# Patient Record
Sex: FEMALE | Race: WHITE | NOT HISPANIC OR LATINO | Employment: FULL TIME | URBAN - METROPOLITAN AREA
[De-identification: names, ages, dates, MRNs, and addresses within clinical notes are randomized per-mention and may not be internally consistent; named-entity substitution may affect disease eponyms.]

---

## 2018-09-05 ENCOUNTER — TELEPHONE (OUTPATIENT)
Dept: ADMINISTRATIVE | Facility: HOSPITAL | Age: 63
End: 2018-09-05

## 2019-02-18 ENCOUNTER — OFFICE VISIT (OUTPATIENT)
Dept: URGENT CARE | Facility: CLINIC | Age: 64
End: 2019-02-18
Payer: MEDICAID

## 2019-02-18 VITALS
SYSTOLIC BLOOD PRESSURE: 124 MMHG | DIASTOLIC BLOOD PRESSURE: 102 MMHG | BODY MASS INDEX: 37.45 KG/M2 | WEIGHT: 233 LBS | TEMPERATURE: 98 F | HEART RATE: 69 BPM | OXYGEN SATURATION: 97 % | HEIGHT: 66 IN

## 2019-02-18 DIAGNOSIS — S99.912A INJURY OF LEFT ANKLE, INITIAL ENCOUNTER: Primary | ICD-10-CM

## 2019-02-18 PROCEDURE — 73610 XR ANKLE COMPLETE 3 VIEW LEFT: ICD-10-PCS | Mod: LT,S$GLB,, | Performed by: RADIOLOGY

## 2019-02-18 PROCEDURE — 73630 XR FOOT COMPLETE 3 VIEW LEFT: ICD-10-PCS | Mod: LT,S$GLB,, | Performed by: RADIOLOGY

## 2019-02-18 PROCEDURE — 99214 OFFICE O/P EST MOD 30 MIN: CPT | Mod: S$GLB,,, | Performed by: PHYSICIAN ASSISTANT

## 2019-02-18 PROCEDURE — 73610 X-RAY EXAM OF ANKLE: CPT | Mod: LT,S$GLB,, | Performed by: RADIOLOGY

## 2019-02-18 PROCEDURE — 73630 X-RAY EXAM OF FOOT: CPT | Mod: LT,S$GLB,, | Performed by: RADIOLOGY

## 2019-02-18 PROCEDURE — 99214 PR OFFICE/OUTPT VISIT, EST, LEVL IV, 30-39 MIN: ICD-10-PCS | Mod: S$GLB,,, | Performed by: PHYSICIAN ASSISTANT

## 2019-02-18 RX ORDER — ETODOLAC 400 MG/1
400 TABLET, FILM COATED ORAL 2 TIMES DAILY
Qty: 30 TABLET | Refills: 0 | Status: SHIPPED | OUTPATIENT
Start: 2019-02-18 | End: 2019-03-05

## 2019-02-18 NOTE — PROGRESS NOTES
"Subjective:       Patient ID: Conchita Iyer is a 63 y.o. female.    Vitals:  height is 5' 6" (1.676 m) and weight is 105.7 kg (233 lb). Her oral temperature is 97.5 °F (36.4 °C). Her blood pressure is 124/102 (abnormal) and her pulse is 69. Her oxygen saturation is 97%.     Chief Complaint: Ankle Injury    Ankle Injury    The incident occurred 12 to 24 hours ago. The incident occurred at home. The injury mechanism was an inversion injury. The pain is present in the left ankle. The quality of the pain is described as aching. The pain is at a severity of 8/10. The pain is moderate. The pain has been constant since onset. Pertinent negatives include no numbness or tingling. She reports no foreign bodies present. The symptoms are aggravated by weight bearing. She has tried ice for the symptoms. The treatment provided mild relief.       Constitution: Negative for fatigue.   HENT: Negative for facial swelling and facial trauma.    Neck: Negative for neck stiffness.   Cardiovascular: Negative for chest trauma.   Eyes: Negative for eye trauma, double vision and blurred vision.   Gastrointestinal: Positive for nausea. Negative for abdominal trauma, abdominal pain and rectal bleeding.   Genitourinary: Negative for hematuria, missed menses, genital trauma and pelvic pain.   Musculoskeletal: Positive for pain, trauma and joint pain. Negative for joint swelling, abnormal ROM of joint and back pain.   Skin: Negative for color change, wound, abrasion, laceration and bruising.   Neurological: Negative for dizziness, history of vertigo, light-headedness, coordination disturbances, altered mental status, loss of consciousness and numbness.   Hematologic/Lymphatic: Negative for history of bleeding disorder.   Psychiatric/Behavioral: Negative for altered mental status.       Objective:      Physical Exam   Constitutional: She is oriented to person, place, and time. She appears well-developed and well-nourished. She is cooperative.  " Non-toxic appearance. She does not appear ill. No distress.   HENT:   Head: Normocephalic and atraumatic.   Right Ear: Hearing, tympanic membrane, external ear and ear canal normal.   Left Ear: Hearing, tympanic membrane, external ear and ear canal normal.   Nose: Nose normal. No mucosal edema, rhinorrhea or nasal deformity. No epistaxis. Right sinus exhibits no maxillary sinus tenderness and no frontal sinus tenderness. Left sinus exhibits no maxillary sinus tenderness and no frontal sinus tenderness.   Mouth/Throat: Uvula is midline, oropharynx is clear and moist and mucous membranes are normal. No trismus in the jaw. Normal dentition. No uvula swelling. No posterior oropharyngeal erythema.   Eyes: Conjunctivae and lids are normal. Right eye exhibits no discharge. Left eye exhibits no discharge. No scleral icterus.   Sclera clear bilat   Neck: Trachea normal, normal range of motion, full passive range of motion without pain and phonation normal. Neck supple.   Cardiovascular: Normal rate, regular rhythm, normal heart sounds, intact distal pulses and normal pulses.   Pulmonary/Chest: Effort normal and breath sounds normal. No respiratory distress.   Abdominal: Soft. Normal appearance and bowel sounds are normal. She exhibits no distension, no pulsatile midline mass and no mass. There is no tenderness.   Musculoskeletal: Normal range of motion. She exhibits no edema or deformity.        Left ankle: She exhibits swelling. She exhibits no deformity, no laceration and normal pulse. Tenderness. AITFL tenderness found. Achilles tendon normal. Achilles tendon exhibits normal Jimenez's test results.        Feet:    Neurological: She is alert and oriented to person, place, and time. She exhibits normal muscle tone. Coordination normal.   Skin: Skin is warm, dry and intact. She is not diaphoretic. No pallor.   Psychiatric: She has a normal mood and affect. Her speech is normal and behavior is normal. Judgment and thought  content normal. Cognition and memory are normal.   Nursing note and vitals reviewed.      Xr Ankle Complete 3 View Left    Result Date: 2/18/2019  EXAMINATION: XR ANKLE COMPLETE 3 VIEW LEFT CLINICAL HISTORY: Unspecified injury of left ankle, initial encounter TECHNIQUE: AP, lateral and oblique views of the left ankle were performed. 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  Mild heel spur, DJD changes dorsal tarsal joints.     No fracture dislocation.  Soft tissue injury particularly lateral ankle. Electronically signed by: Beny Alonso MD Date:    02/18/2019 Time:    12:09    Xr Foot Complete 3 View Left    Result Date: 2/18/2019  EXAMINATION: XR FOOT COMPLETE 3 VIEW LEFT CLINICAL HISTORY: .  Unspecified injury of left ankle, initial encounter TECHNIQUE: AP, lateral and oblique views of the left foot were performed. COMPARISON: None FINDINGS: DJD 1st MTP, included ankle joint and dorsal aspect tarsal joints.  Heel spur.     No fracture dislocation. Electronically signed by: Beny Alonso MD Date:    02/18/2019 Time:    12:11      Assessment:       1. Injury of left ankle, initial encounter        Plan:         Injury of left ankle, initial encounter  -     XR FOOT COMPLETE 3 VIEW LEFT; Future; Expected date: 02/18/2019  -     XR ANKLE COMPLETE 3 VIEW LEFT; Future; Expected date: 02/18/2019  -     ORTHOPEDIC BRACING FOR HOME USE - LOWER EXTREMITY  -     etodolac (LODINE) 400 MG tablet; Take 1 tablet (400 mg total) by mouth 2 (two) times daily. for 15 days  Dispense: 30 tablet; Refill: 0  -     CRUTCHES FOR HOME USE  -     Ambulatory referral to Orthopedics      Patient Instructions   · Follow up with your primary care in 2-5 days if symptoms have not improved, or you may return here.  · If you were referred to a specialist, please  follow up with that specialty.  · If you were prescribed antibiotics, please take them to completion.  · If you were prescribed a narcotic or any medication with sedative effects, do not drive or operate heavy equipment or machinery while taking these medications.  · You must understand that you have received treatment at an Urgent Care facility only, and that you may be released before all of your medical problems are known or treated. Urgent Care facilities are not equipped to handle life threatening emergencies. It is recommended that you go to an Emergency Department for further evaluation of worsening or concerning symptoms, or possibly life threatening conditions as discussed.                                        If you  smoke, please stop smoking            Ankle Sprain (Adult)  An ankle sprain is a stretching or tearing of the ligaments that hold the ankle joint together. There are no broken bones.  An ankle sprain is a common injury for both children and adults. It happens when the ankle turns, twists, or rolls in an awkward way. This can be caused by a sports injury. Or it can happen from doing something as simple as stepping on an uneven surface.  Ligaments are made of tough connective tissue. Normally, ligaments stretch a certain amount and then go back to their normal place. A sprain happens when a ligament is forced to stretch more than the normal amount. A severe sprain can actually tear the ligaments. If you have a severe sprain, you may have felt or heard something like a pop when you were injured.  Ankle sprains are given a grade depending on whether they are mild, moderate, or severe:  · Grade 1 sprain. A mild sprain with minor stretching and damage to the ligament.  · Grade 2 sprain. A moderate sprain where the ligament is partly torn.  · Grade 3 sprain. The most severe kind of sprain. The ligament is completely torn.  Most sprains take about 4 to 6 weeks to heal. A severe sprain can take  several months to recover.  Your healthcare provider may order X-rays to be sure you dont have a fracture, or broken bone.  The injured area will feel sore.  Swelling and pain may make it hard to walk. You may need crutches if walking is painful. Or your provider may have you use a cast boot or air splint. This will depend on the grade of ankle sprain that you have.    Home care  · For a Grade 1 sprain, use RICE (rest, ice, compression, and elevation):  · Rest your ankle. Dont walk on it.  · Ice should be used right away to help control swelling. Place an ice pack over the injured area for 20 minutes. Do this every 3 to 6 hours for the first 24 to 48 hours. Keep using ice packs to ease pain and swelling as needed. To make an ice pack, put ice cubes in a plastic bag that seals at the top. Wrap the bag in a clean, thin towel or cloth. Never put ice or an ice pack directly on the skin. The ice pack can be put right on the cast, bandage, or splint. As the ice melts, be careful that the cast, bandage, or splint doesnt get wet. If you have a boot, open it to apply an ice pack, unless told otherwise by your provider.  · Compression devices help to control swelling. They also keep the ankle from moving and support your injured ankle. These devices include dressings, bandages, and wraps.  · Elevate or raise your ankle above the level of your heart when sitting or lying down. This is very important for the first 48 hours.  · Follow the RICE guidelines for a Grade 2 sprain. This type of sprain will take longer to heal. Your provider may have you wear a splint, cast, or brace to keep your ankle from moving.  ·  If you have a Grade 3 sprain, you are at risk for long-term ankle instability. In rare cases, surgery may be needed. Your provider may have you wear a short leg cast or a walking boot for 2 to 3 weeks.  · After 48 hours, it may be helpful to apply heat for 20 minutes several times a day. You can do this with a heating  pad or warm compress. Or you may want to go back and forth between using ice and heat. Never apply heat directly to the skin. Always wrap the heating pad or warm compress in a clean, thin towel or cloth.  · You may use over-the-counter pain medicine (NSAIDS or nonsteroidal anti-inflammatory drugs) to control pain, unless another pain medicine was prescribed. Talk with your provider before using these medicines if you have chronic liver or kidney disease, or have ever had a stomach ulcer or GI (gastrointestinal) bleeding.  · Follow any rehabilitation exercises your provider gives you. These can help you be more flexible and improve your balance and coordination. This is helpful in preventing long-term ankle problems.  Prevention  To help prevent ankle sprains, its important to have good strength, balance, and flexibility. Be sure to:  · Always warm up before you exercise or do something very active  · Be careful when walking or running on uneven or cracked surfaces  · Wear shoes that are in good condition and fit well  · Listen to your bodys signals to slow down when you are in pain or tired  Follow-up care  Any X-rays you had today dont show any broken bones, breaks, or fractures. Sometimes fractures dont show up on the first X-ray. Bruises and sprains can sometimes hurt as much as a fracture. These injuries can take time to heal completely. If your symptoms dont get better or they get worse, talk with your healthcare provider. You may need a repeat X-ray.  Follow up with your healthcare provider, or as advised. Check for any warning signs listed below.  When to seek medical advice  Call your healthcare provider right away if any of these occur:  · Fever of 100.4 F (38 C) or higher, or as directed by your healthcare provider  · The injury doesnt seem to be healing  · The swelling comes back  · The cast has a bad smell  · The plaster cast or splint gets wet or soft  · The fiberglass cast or splint gets wet and  does not dry for 24 hours  · The pain or swelling increases, or redness appears  · Your toes become cold, blue, numb, or tingly  · The skin is discolored (looks blue, purple, or gray), has blisters, or is irritated  · You re-injure your ankle  Date Last Reviewed: 11/20/2015  © 7134-1897 [employer]. [address] [place] [address]. All rights reserved. This information is not intended as a substitute for professional medical care. Always follow your healthcare professional's instructions.

## 2019-02-18 NOTE — PATIENT INSTRUCTIONS
· Follow up with your primary care in 2-5 days if symptoms have not improved, or you may return here.  · If you were referred to a specialist, please follow up with that specialty.  · If you were prescribed antibiotics, please take them to completion.  · If you were prescribed a narcotic or any medication with sedative effects, do not drive or operate heavy equipment or machinery while taking these medications.  · You must understand that you have received treatment at an Urgent Care facility only, and that you may be released before all of your medical problems are known or treated. Urgent Care facilities are not equipped to handle life threatening emergencies. It is recommended that you go to an Emergency Department for further evaluation of worsening or concerning symptoms, or possibly life threatening conditions as discussed.                                        If you  smoke, please stop smoking            Ankle Sprain (Adult)  An ankle sprain is a stretching or tearing of the ligaments that hold the ankle joint together. There are no broken bones.  An ankle sprain is a common injury for both children and adults. It happens when the ankle turns, twists, or rolls in an awkward way. This can be caused by a sports injury. Or it can happen from doing something as simple as stepping on an uneven surface.  Ligaments are made of tough connective tissue. Normally, ligaments stretch a certain amount and then go back to their normal place. A sprain happens when a ligament is forced to stretch more than the normal amount. A severe sprain can actually tear the ligaments. If you have a severe sprain, you may have felt or heard something like a pop when you were injured.  Ankle sprains are given a grade depending on whether they are mild, moderate, or severe:  · Grade 1 sprain. A mild sprain with minor stretching and damage to the ligament.  · Grade 2 sprain. A moderate sprain where the ligament is partly torn.  · Grade 3  sprain. The most severe kind of sprain. The ligament is completely torn.  Most sprains take about 4 to 6 weeks to heal. A severe sprain can take several months to recover.  Your healthcare provider may order X-rays to be sure you dont have a fracture, or broken bone.  The injured area will feel sore.  Swelling and pain may make it hard to walk. You may need crutches if walking is painful. Or your provider may have you use a cast boot or air splint. This will depend on the grade of ankle sprain that you have.    Home care  · For a Grade 1 sprain, use RICE (rest, ice, compression, and elevation):  · Rest your ankle. Dont walk on it.  · Ice should be used right away to help control swelling. Place an ice pack over the injured area for 20 minutes. Do this every 3 to 6 hours for the first 24 to 48 hours. Keep using ice packs to ease pain and swelling as needed. To make an ice pack, put ice cubes in a plastic bag that seals at the top. Wrap the bag in a clean, thin towel or cloth. Never put ice or an ice pack directly on the skin. The ice pack can be put right on the cast, bandage, or splint. As the ice melts, be careful that the cast, bandage, or splint doesnt get wet. If you have a boot, open it to apply an ice pack, unless told otherwise by your provider.  · Compression devices help to control swelling. They also keep the ankle from moving and support your injured ankle. These devices include dressings, bandages, and wraps.  · Elevate or raise your ankle above the level of your heart when sitting or lying down. This is very important for the first 48 hours.  · Follow the RICE guidelines for a Grade 2 sprain. This type of sprain will take longer to heal. Your provider may have you wear a splint, cast, or brace to keep your ankle from moving.  ·  If you have a Grade 3 sprain, you are at risk for long-term ankle instability. In rare cases, surgery may be needed. Your provider may have you wear a short leg cast or a  walking boot for 2 to 3 weeks.  · After 48 hours, it may be helpful to apply heat for 20 minutes several times a day. You can do this with a heating pad or warm compress. Or you may want to go back and forth between using ice and heat. Never apply heat directly to the skin. Always wrap the heating pad or warm compress in a clean, thin towel or cloth.  · You may use over-the-counter pain medicine (NSAIDS or nonsteroidal anti-inflammatory drugs) to control pain, unless another pain medicine was prescribed. Talk with your provider before using these medicines if you have chronic liver or kidney disease, or have ever had a stomach ulcer or GI (gastrointestinal) bleeding.  · Follow any rehabilitation exercises your provider gives you. These can help you be more flexible and improve your balance and coordination. This is helpful in preventing long-term ankle problems.  Prevention  To help prevent ankle sprains, its important to have good strength, balance, and flexibility. Be sure to:  · Always warm up before you exercise or do something very active  · Be careful when walking or running on uneven or cracked surfaces  · Wear shoes that are in good condition and fit well  · Listen to your bodys signals to slow down when you are in pain or tired  Follow-up care  Any X-rays you had today dont show any broken bones, breaks, or fractures. Sometimes fractures dont show up on the first X-ray. Bruises and sprains can sometimes hurt as much as a fracture. These injuries can take time to heal completely. If your symptoms dont get better or they get worse, talk with your healthcare provider. You may need a repeat X-ray.  Follow up with your healthcare provider, or as advised. Check for any warning signs listed below.  When to seek medical advice  Call your healthcare provider right away if any of these occur:  · Fever of 100.4 F (38 C) or higher, or as directed by your healthcare provider  · The injury doesnt seem to be  healing  · The swelling comes back  · The cast has a bad smell  · The plaster cast or splint gets wet or soft  · The fiberglass cast or splint gets wet and does not dry for 24 hours  · The pain or swelling increases, or redness appears  · Your toes become cold, blue, numb, or tingly  · The skin is discolored (looks blue, purple, or gray), has blisters, or is irritated  · You re-injure your ankle  Date Last Reviewed: 11/20/2015 © 2000-2017 [employer]. [address] [place] [address]. All rights reserved. This information is not intended as a substitute for professional medical care. Always follow your healthcare professional's instructions.

## 2019-02-20 ENCOUNTER — TELEPHONE (OUTPATIENT)
Dept: URGENT CARE | Facility: CLINIC | Age: 64
End: 2019-02-20

## 2019-02-20 NOTE — TELEPHONE ENCOUNTER
Called about crutches she was given a scribe for Monday. Needs a PA we informed her that we do not do PA's and that we might receive some crutches dannielle to call back.

## 2019-03-11 ENCOUNTER — OFFICE VISIT (OUTPATIENT)
Dept: URGENT CARE | Facility: CLINIC | Age: 64
End: 2019-03-11
Payer: MEDICAID

## 2019-03-11 VITALS
SYSTOLIC BLOOD PRESSURE: 138 MMHG | HEART RATE: 76 BPM | BODY MASS INDEX: 37.45 KG/M2 | OXYGEN SATURATION: 98 % | TEMPERATURE: 99 F | HEIGHT: 66 IN | RESPIRATION RATE: 20 BRPM | WEIGHT: 233 LBS | DIASTOLIC BLOOD PRESSURE: 69 MMHG

## 2019-03-11 DIAGNOSIS — J01.40 ACUTE NON-RECURRENT PANSINUSITIS: ICD-10-CM

## 2019-03-11 DIAGNOSIS — J02.9 ACUTE PHARYNGITIS, UNSPECIFIED ETIOLOGY: Primary | ICD-10-CM

## 2019-03-11 PROCEDURE — 99214 OFFICE O/P EST MOD 30 MIN: CPT | Mod: S$GLB,,, | Performed by: FAMILY MEDICINE

## 2019-03-11 PROCEDURE — 99214 PR OFFICE/OUTPT VISIT, EST, LEVL IV, 30-39 MIN: ICD-10-PCS | Mod: S$GLB,,, | Performed by: FAMILY MEDICINE

## 2019-03-11 RX ORDER — CEFDINIR 300 MG/1
300 CAPSULE ORAL 2 TIMES DAILY
Qty: 20 CAPSULE | Refills: 0 | Status: SHIPPED | OUTPATIENT
Start: 2019-03-11 | End: 2019-03-21

## 2019-03-11 RX ORDER — CODEINE PHOSPHATE AND GUAIFENESIN 10; 100 MG/5ML; MG/5ML
10 SOLUTION ORAL NIGHTLY PRN
Qty: 100 ML | Refills: 0 | Status: SHIPPED | OUTPATIENT
Start: 2019-03-11 | End: 2019-03-21

## 2019-03-11 RX ORDER — CLARITHROMYCIN 500 MG/1
500 TABLET, FILM COATED ORAL 2 TIMES DAILY
Qty: 20 TABLET | Refills: 0 | Status: SHIPPED | OUTPATIENT
Start: 2019-03-11 | End: 2019-03-21

## 2019-03-11 NOTE — LETTER
March 11, 2019  Conchita Iyer  [address]  [place] [address]                [facility] [place]  [address], [address]  [place] [address]  Phone: [phone]  Fax: [phone] Jackie Iyer was seen and treated in our Urgent Care department   on 3/11/2019. She may return to work in 2 - 3 days.      If you have any questions or concerns, please don't hesitate to call.    Sincerely,        Estevan Contreras MD

## 2019-03-11 NOTE — PROGRESS NOTES
"Subjective:       Patient ID: Conchita Iyer is a 63 y.o. female.    Vitals:  height is 5' 6" (1.676 m) and weight is 105.7 kg (233 lb). Her oral temperature is 98.6 °F (37 °C). Her blood pressure is 138/69 and her pulse is 76. Her respiration is 20 and oxygen saturation is 98%.     Chief Complaint: Cough    Cough   This is a new problem. The current episode started in the past 7 days (x5days). The problem has been gradually worsening. The cough is non-productive. Associated symptoms include shortness of breath. Pertinent negatives include no chills, ear pain, eye redness, fever, hemoptysis, myalgias, rash, sore throat or wheezing. Nothing aggravates the symptoms. She has tried OTC cough suppressant for the symptoms. The treatment provided mild relief.       Constitution: Negative for chills, sweating, fatigue and fever.   HENT: Positive for sinus pressure. Negative for ear pain, congestion, sinus pain, sore throat and voice change.    Neck: Negative for painful lymph nodes.   Eyes: Negative for eye redness.   Respiratory: Positive for cough and shortness of breath. Negative for chest tightness, sputum production, bloody sputum, COPD, stridor, wheezing and asthma.    Gastrointestinal: Negative for nausea and vomiting.   Musculoskeletal: Negative for muscle ache.   Skin: Negative for rash.   Allergic/Immunologic: Negative for seasonal allergies and asthma.   Hematologic/Lymphatic: Negative for swollen lymph nodes.       Objective:      Physical Exam   Constitutional: She is oriented to person, place, and time. She appears well-developed and well-nourished. She is cooperative.  Non-toxic appearance. She does not appear ill. No distress.   HENT:   Head: Normocephalic and atraumatic.   Right Ear: Hearing, tympanic membrane, external ear and ear canal normal.   Left Ear: Hearing, tympanic membrane, external ear and ear canal normal.   Nose: No mucosal edema, rhinorrhea or nasal deformity. No epistaxis. Right sinus " exhibits maxillary sinus tenderness and frontal sinus tenderness. Left sinus exhibits maxillary sinus tenderness and frontal sinus tenderness.   Mouth/Throat: Uvula is midline and mucous membranes are normal. No trismus in the jaw. Normal dentition. No uvula swelling. Posterior oropharyngeal edema and posterior oropharyngeal erythema present.   Eyes: Conjunctivae and lids are normal. No scleral icterus.   Neck: Trachea normal, full passive range of motion without pain and phonation normal. Neck supple.   Cardiovascular: Normal rate, regular rhythm, normal heart sounds, intact distal pulses and normal pulses.   Pulmonary/Chest: Effort normal and breath sounds normal. No respiratory distress.   Abdominal: Soft. Normal appearance and bowel sounds are normal. She exhibits no distension. There is no tenderness.   Musculoskeletal: Normal range of motion. She exhibits no edema or deformity.   Neurological: She is alert and oriented to person, place, and time. She exhibits normal muscle tone. Coordination normal.   Skin: Skin is warm, dry and intact. She is not diaphoretic. No pallor.   Psychiatric: She has a normal mood and affect. Her speech is normal and behavior is normal. Judgment and thought content normal. Cognition and memory are normal.   Nursing note and vitals reviewed.      Assessment:       1. Acute pharyngitis, unspecified etiology    2. Acute non-recurrent pansinusitis        Plan:         Acute pharyngitis, unspecified etiology    Acute non-recurrent pansinusitis  -     cefdinir (OMNICEF) 300 MG capsule; Take 1 capsule (300 mg total) by mouth 2 (two) times daily. for 10 days  Dispense: 20 capsule; Refill: 0  -     pseudoephedrine-DM-guaifenesin (POLY-VENT DM) 60- mg Tab; Take 1 tablet by mouth every 6 (six) hours as needed.  Dispense: 20 tablet; Refill: 0  -     guaifenesin-codeine 100-10 mg/5 ml (CHERATUSSIN AC)  mg/5 mL syrup; Take 10 mLs by mouth nightly as needed for Cough.  Dispense: 100 mL;  Refill: 0    Please drink plenty of fluids.  Please get plenty of rest.  Please return here or go to the Emergency Department for any concerns or worsening of condition.  If you were given wait & see antibiotics, please wait 3-5 days before taking them, and only take them if your symptoms have worsened or not improved.  If you do begin taking the antibiotics, please take them to completion.  If you were prescribed antibiotics, please take them to completion.  If you were prescribed a narcotic medication, do not drive or operate heavy equipment or machinery while taking these medications.    You were given a decongestant (RESCON or POLY VENT Dm).  If your insurance does not cover it or you cannot afford it, it is ok to use the over the counter products listed below.  If you do not have Hypertension or any history of palpitations, it is ok to take over the counter Sudafed or Mucinex D or Allegra-D or Claritin-D or Zyrtec-D.  If you do take one of the above, it is ok to combine that with plain over the counter Mucinex or Allegra or Claritin or Zyrtec.  If for example you are taking Zyrtec -D, you can combine that with Mucinex, but not Mucinex-D.  If you are taking Mucinex-D, you can combine that with plain Allegra or Claritin or Zyrtec.   If you do have Hypertension or palpitations, it is safe to take Coricidin HBP for relief of sinus symptoms.    We recommend you take over the counter Flonase (Fluticasone) or another nasally inhaled steroid unless you are already taking one.  Nasal irrigation with a saline spray or Netti Pot like device per their directions is also recommended.  If not allergic, please take over the counter Tylenol (Acetaminophen) and/or Motrin (Ibuprofen) as directed for control of pain and/or fever.    Robitussin DM 2 teas every 4 hours as needed for cough.  If you  smoke, please stop smoking.    Please follow up with your primary care doctor or specialist as needed.  Dianna Villarreal,  [facility]  [phone]

## 2019-03-11 NOTE — PATIENT INSTRUCTIONS
Please drink plenty of fluids.  Please get plenty of rest.  Please return here or go to the Emergency Department for any concerns or worsening of condition.  If you were given wait & see antibiotics, please wait 3-5 days before taking them, and only take them if your symptoms have worsened or not improved.  If you do begin taking the antibiotics, please take them to completion.  If you were prescribed antibiotics, please take them to completion.  If you were prescribed a narcotic medication, do not drive or operate heavy equipment or machinery while taking these medications.    You were given a decongestant (RESCON or POLY VENT Dm).  If your insurance does not cover it or you cannot afford it, it is ok to use the over the counter products listed below.  If you do not have Hypertension or any history of palpitations, it is ok to take over the counter Sudafed or Mucinex D or Allegra-D or Claritin-D or Zyrtec-D.  If you do take one of the above, it is ok to combine that with plain over the counter Mucinex or Allegra or Claritin or Zyrtec.  If for example you are taking Zyrtec -D, you can combine that with Mucinex, but not Mucinex-D.  If you are taking Mucinex-D, you can combine that with plain Allegra or Claritin or Zyrtec.   If you do have Hypertension or palpitations, it is safe to take Coricidin HBP for relief of sinus symptoms.    We recommend you take over the counter Flonase (Fluticasone) or another nasally inhaled steroid unless you are already taking one.  Nasal irrigation with a saline spray or Netti Pot like device per their directions is also recommended.  If not allergic, please take over the counter Tylenol (Acetaminophen) and/or Motrin (Ibuprofen) as directed for control of pain and/or fever.    Robitussin DM 2 teas every 4 hours as needed for cough.  If you  smoke, please stop smoking.    You were given an injection of steroid.  This will relieve swelling and inflammation and improve respiratory  symptoms.  It can raise your blood sugar if you are diabetic.    Please follow up with your primary care doctor or specialist as needed.  Dianna Villarreal, [facility]  [phone]        Acute Bacterial Rhinosinusitis (ABRS)  Acute bacterial rhinosinusitis (ABRS) is an infection of your nasal cavity and sinuses. Its caused by bacteria. Acute means that youve had symptoms for less than 12 weeks.  Understanding your sinuses  The nasal cavity is the large air-filled space behind your nose. The sinuses are a group of spaces formed by the bones of your face. They connect with your nasal cavity. ABRS causes the tissue lining these spaces to become inflamed. Mucus may not drain normally. This leads to facial pain and other symptoms.  What causes ABRS?  ABRS most often follows an upper respiratory infection caused by a virus. Bacteria then infect the lining of your nasal cavity and sinuses. But you can also get ABRS if you have:  · Nasal allergies  · Long-term nasal swelling and congestion not caused by allergies  · Blockage in the nose  Symptoms of ABRS  The symptoms of ABRS may be different for each person, and can include:  · Nasal congestion  · Runny nose  · Fluid draining from the nose down the throat (postnasal drip)  · Headache  · Cough  · Pain in the sinuses  · Thick, colored fluid from the nose (mucus)  · Fever  Diagnosing ABRS  ABRS may be diagnosed if youve had an upper respiratory infection like a cold and cough for longer than 10 to 14 days. Your health care provider will ask about your symptoms and your medical history. The provider will check your vital signs, including your temperature. Youll have a physical exam. The health care provider will check your ears, nose, and throat. You likely wont need any tests. If ABRS comes back, you may have a culture or other tests.  Treatment for ABRS  Treatment may include:  · Antibiotic medicine. This is for symptoms that last for at least 10 to 14 days.  · Nasal  corticosteroid medicine. Drops or spray used in the nose can lessen swelling and congestion.  · Over-the-counter pain medicine. This is to lessen sinus pain and pressure.  · Nasal decongestant medicine. Spray or drops may help to lessen congestion. Do not use them for more than a few days.  · Salt wash (saline irrigation). This can help to loosen mucus.  Possible complications of ABRS  ABRS may come back or become long-term (chronic).  In rare cases, ABRS may cause complications such as:   · Inflamed tissue around the brain and spinal cord (meningitis)  · Inflamed tissue around the eyes (orbital cellulitis)  · Inflamed bones around the sinuses (osteitis)  These problems may need to be treated in a hospital with intravenous (IV) antibiotic medicine or surgery.  When to call the health care provider  Call your health care provider if you have any of the following:  · Symptoms that dont get better, or get worse  · Symptoms that dont get better after 3 to 5 days on antibiotics  · Trouble seeing  · Swelling around your eyes  · Confusion or trouble staying awake   Date Last Reviewed: 3/3/2015  © 5743-9271 [employer]. [address] [place] [address]. All rights reserved. This information is not intended as a substitute for professional medical care. Always follow your healthcare professional's instructions.      Pharyngitis: Strep (Presumed)    You have pharyngitis (sore throat). The cause is thought to be the streptococcus, or strep, bacterium. Strep throat infection can cause throat pain that is worse when swallowing, aching all over, headache, and fever. The infection may be spread by coughing, kissing, or touching others after touching your mouth or nose. Antibiotic medications are given to treat the infection.  Home care  · Rest at home. Drink plenty of fluids to avoid dehydration.  · No work or school for the first 2 days of taking the antibiotics. After this time, you will not be  contagious. You can then return to work or school if you are feeling better.   · The antibiotic medication must be taken for the full 10 days, even if you feel better. This is very important to ensure the infection is treated. It is also important to prevent drug-resistant organisms from developing. If you were given an antibiotic shot, no more antibiotics are needed.  · You may use acetaminophen or ibuprofen to control pain or fever, unless another medicine was prescribed for this. If you have chronic liver or kidney disease or ever had a stomach ulcer or GI bleeding, talk with your doctor before using these medicines.  · Throat lozenges or a throat-numbing sprays can help reduce throat pain. Gargling with warm salt water can also help. Dissolve 1/2 teaspoon of salt in 1 8 ounce glass of warm water.   · Avoid salty or spicy foods, which can irritate the throat.  Follow-up care  Follow up with your healthcare provider or our staff if you are not improving over the next week.  When to seek medical advice  Call your healthcare provider right away if any of these occur:  · Fever as directed by your doctor.   · New or worsening ear pain, sinus pain, or headache  · Painful lumps in the back of neck  · Stiff neck  · Lymph nodes are getting larger  · Inability to swallow liquids, excessive drooling, or inability to open mouth wide due to throat pain  · Signs of dehydration (very dark urine or no urine, sunken eyes, dizziness)  · Trouble breathing or noisy breathing  · Muffled voice  · New rash  Date Last Reviewed: 4/13/2015  © 2422-7527 [employer]. [address] [place] [address], [place] [address]. All rights reserved. This information is not intended as a substitute for professional medical care. Always follow your healthcare professional's instructions.

## 2019-04-18 ENCOUNTER — OFFICE VISIT (OUTPATIENT)
Dept: URGENT CARE | Facility: CLINIC | Age: 64
End: 2019-04-18
Payer: MEDICAID

## 2019-04-18 VITALS
HEART RATE: 73 BPM | BODY MASS INDEX: 36.8 KG/M2 | TEMPERATURE: 98 F | SYSTOLIC BLOOD PRESSURE: 124 MMHG | OXYGEN SATURATION: 97 % | HEIGHT: 66 IN | DIASTOLIC BLOOD PRESSURE: 67 MMHG | WEIGHT: 229 LBS | RESPIRATION RATE: 18 BRPM

## 2019-04-18 DIAGNOSIS — J06.9 VIRAL URI WITH COUGH: Primary | ICD-10-CM

## 2019-04-18 DIAGNOSIS — I10 ESSENTIAL HYPERTENSION: ICD-10-CM

## 2019-04-18 PROCEDURE — 99214 PR OFFICE/OUTPT VISIT, EST, LEVL IV, 30-39 MIN: ICD-10-PCS | Mod: S$GLB,,, | Performed by: NURSE PRACTITIONER

## 2019-04-18 PROCEDURE — 99214 OFFICE O/P EST MOD 30 MIN: CPT | Mod: S$GLB,,, | Performed by: NURSE PRACTITIONER

## 2019-04-18 RX ORDER — FLUTICASONE PROPIONATE 50 MCG
1 SPRAY, SUSPENSION (ML) NASAL DAILY
Qty: 1 BOTTLE | Refills: 0 | Status: SHIPPED | OUTPATIENT
Start: 2019-04-18 | End: 2019-06-27 | Stop reason: SDUPTHER

## 2019-04-18 NOTE — PROGRESS NOTES
"Subjective:       Patient ID: Conchita Iyer is a 63 y.o. female.    Vitals:  height is 5' 6" (1.676 m) and weight is 103.9 kg (229 lb). Her oral temperature is 98 °F (36.7 °C). Her blood pressure is 124/67 and her pulse is 73. Her respiration is 18 and oxygen saturation is 97%.     Chief Complaint: Sore Throat    This is a 63yr old female presenting for left earache, sore throat, nasal congestion and cough x3 days, no fevers, no chills, no bodyaches, no difficulty swallowing. No known sick exposures. Pt reports having chronic allergies, takes claritin daily as well. NIDDM, gluc bid checks at home, 160-180 average, none over 180.     Sore Throat    This is a new problem. Episode onset: 3 day ago. The problem has been gradually worsening. The pain is worse on the left side. There has been no fever. The pain is at a severity of 7/10. The pain is moderate. Associated symptoms include congestion, coughing, ear pain, headaches and a hoarse voice. Pertinent negatives include no abdominal pain, diarrhea, drooling, ear discharge, plugged ear sensation, neck pain, shortness of breath, stridor, swollen glands, trouble swallowing or vomiting. She has had no exposure to strep or mono. Treatments tried: Chloraseptic. The treatment provided mild relief.       Constitution: Negative for chills, fatigue and fever.   HENT: Positive for ear pain, congestion, sinus pressure and sore throat. Negative for ear discharge, drooling and trouble swallowing.    Neck: Negative for neck pain, neck stiffness and painful lymph nodes.   Cardiovascular: Negative for chest pain and leg swelling.   Eyes: Negative for double vision and blurred vision.   Respiratory: Positive for cough. Negative for sputum production, shortness of breath and stridor.    Gastrointestinal: Negative for abdominal pain, nausea, vomiting and diarrhea.   Genitourinary: Negative for dysuria, frequency, urgency and history of kidney stones.   Musculoskeletal: Negative for " joint pain, joint swelling, muscle cramps and muscle ache.   Skin: Negative for color change, pale, rash and bruising.   Allergic/Immunologic: Negative for seasonal allergies.   Neurological: Positive for headaches. Negative for dizziness, history of vertigo, light-headedness and passing out.   Hematologic/Lymphatic: Negative for swollen lymph nodes.   Psychiatric/Behavioral: Negative for nervous/anxious, sleep disturbance and depression. The patient is not nervous/anxious.        Objective:      Physical Exam   Constitutional: She is oriented to person, place, and time. She appears well-developed and well-nourished. She is cooperative.  Non-toxic appearance. She does not appear ill. No distress.   HENT:   Head: Normocephalic and atraumatic.   Right Ear: Hearing, tympanic membrane, external ear and ear canal normal. No mastoid tenderness.   Left Ear: Hearing, tympanic membrane, external ear and ear canal normal. No mastoid tenderness. Tympanic membrane is not injected, not perforated and not erythematous.   Nose: Rhinorrhea (with cobblestone appearance) present. No mucosal edema or nasal deformity. No epistaxis. Right sinus exhibits no maxillary sinus tenderness and no frontal sinus tenderness. Left sinus exhibits no maxillary sinus tenderness and no frontal sinus tenderness.   Mouth/Throat: Uvula is midline and mucous membranes are normal. No trismus in the jaw. Normal dentition. No uvula swelling. No posterior oropharyngeal erythema. Tonsils are 0 on the right. Tonsils are 0 on the left. No tonsillar exudate.   Left TM is dull, small bones not visible. No tragal tenderness. Posterior pharynx with cobblestone appearance, nonerythematous,  mild post nasal draiange, normal phonation.   Eyes: Pupils are equal, round, and reactive to light. Conjunctivae and lids are normal. Right eye exhibits no discharge. Left eye exhibits no discharge. No scleral icterus.   Sclera clear bilat   Neck: Trachea normal, normal range of  motion, full passive range of motion without pain and phonation normal. Neck supple. No tracheal deviation present.   Cardiovascular: Normal rate, regular rhythm, intact distal pulses and normal pulses.   Murmur (2/10 systolic, pt reports diagnosed 7yrs ago with murmur) heard.  Pulmonary/Chest: Effort normal and breath sounds normal. No respiratory distress. She has no wheezes.   Abdominal: Soft. Normal appearance and bowel sounds are normal. She exhibits no distension. There is no tenderness.   Musculoskeletal: Normal range of motion. She exhibits no edema or deformity.   Lymphadenopathy:     She has no cervical adenopathy.   Neurological: She is alert and oriented to person, place, and time. She exhibits normal muscle tone. Coordination normal.   Skin: Skin is warm, dry and intact. Capillary refill takes less than 2 seconds. No rash noted. She is not diaphoretic. No pallor.   Psychiatric: She has a normal mood and affect. Her speech is normal and behavior is normal. Judgment and thought content normal. Cognition and memory are normal.   Nursing note and vitals reviewed.      Assessment:       1. Viral URI with cough    2. Essential hypertension        Plan:     Exam c/w uri, allergic rhinitis. Alert, nontoxic and in NAD. Afebrile. Airway patent with no evidence peritonsillar abscess. Lungs cta with e/u respirations, no wheezing, no evidence pneumonia. Remainder exam benign. Symptomatic management.     Viral URI with cough    Essential hypertension    Other orders  -     fluticasone (FLONASE) 50 mcg/actuation nasal spray; 1 spray (50 mcg total) by Each Nare route once daily.  Dispense: 1 Bottle; Refill: 0      Patient Instructions   You have been diagnosed with a viral illness. Antibiotics will not help your infection to go away any faster.  You immune system must fight this illness.  You will likely have symptoms for 7-10 days as this is how long a typical virus lasts.  Below are a few things that you can do at  home to help yourself feel better in the mean time.     1.  For patients above 6 months of age who are not allergic to and are not on anticoagulants, you can alternate Tylenol and Motrin every 4-6 hours for fever above 100.4F and/or pain.  For patients less than 6 months of age, allergic to or intolerant to NSAIDS, have gastritis, gastric ulcers, or history of GI bleeds, are pregnant, or are on anticoagulant therapy, you can take Tylenol every 4 hours as needed for fever above 100.4F and/or pain.     2.  Rest and keep yourself well hydrated.  Drink hot liquids (coffee, water, tea, hot chocolate, or soup) 10-12 times a day for 5-7 days.  Put liquid in a mug and place in microwave for 2.5 - 3 minutes. Pour hot liquid into another mug not used to microwave the liquid (to avoid burning your mouth) then sniff the steam from the cup and sip the heated liquid.    3.  You can use these over the counter medications/remedies to help with your symptoms:     Runny Nose:  Use an antihistamine such as Claritin, Zyrtec or Allegra to help dry you out.     Congestion:  Use pseudoephedrine (behind the counter) for congestion- Pseudoephedrine 30 mg up to 240 mg /day. Warning:  It can raise your blood pressure and give you palpitations, avoid with hypertension, palpitation history or severe cardiac disease.  Coricidin HBP is okay to use if you have high blood pressure.     Use mucinex (guaifenisin) up to 2400mg/day to break up/loosen any mucous. MucinexDM has a cough suppressant that can be used for cough and at night to stop the tickle in the back of your throat. Do not take Mucinex-D if taking pseudoephedrine or other decongestant.    Use Nasal Saline to mechanically move any post nasal drip from your eustachian tubes or from the back of your throat.    Use Afrin in each nare, for no longer than 3 days, as it is addictive. It can also dry out your mucous membranes and cause elevated blood pressure.    Use Flonase 1-2 sprays/nostril  per day. It is a local acting steroid nasal spray.  If you develop a bloody nose, stop using the medication immediately. Lightly sniff into nares as the upper nasal mucosa is where this medication works, not the throat.     Sore throat:  Use warm, salt water gargles to ease your throat pain- 1/2 tsp salt to 1 cup warm water, gargle as desired.  Chloraseptic sprays and throat lozenges will also help to ease throat pain. Continue to push fluids, the drier your throat the more it will hurt.    Sometimes Nyquil at night is beneficial to help you get some rest; however, it is sedating and does contain an antihistamine and Tylenol.  Make sure not to double up on these medications. Nyquil may additionally raise you blood pressure.       These things will help you to feel better and will speed your recovery.  If your condition fails to improve in a timely manner, you should receive another evaluation by your Primary Care Provider/Pediatrician to discuss your concerns or return to urgent care for a recheck.  If your condition worsens at any time, you should report immediately to your nearest Emergency Department for further evaluation. **You must understand that you have received Urgent Care treatment only and that you may be released before all of your medical problems are known or treated. You, the patient, are responsible to arrange for follow-up care as instructed.         ·   ·   · Follow up with your primary care in 2-5 days if symptoms have not improved, or you may return here.  · If you were referred to a specialist, please follow up with that specialty.  · If you were prescribed antibiotics, please take them to completion.  · If you were prescribed a narcotic or any medication with sedative effects, do not drive or operate heavy equipment or machinery while taking these medications.  · You must understand that you have received treatment at an Urgent Care facility only, and that you may be released before all of  your medical problems are known or treated. Urgent Care facilities are not equipped to handle life threatening emergencies. It is recommended that you go to an Emergency Department for further evaluation of worsening or concerning symptoms, or possibly life threatening conditions as discussed.                                        If you  smoke, please stop smoking

## 2019-04-18 NOTE — PATIENT INSTRUCTIONS
You have been diagnosed with a viral illness. Antibiotics will not help your infection to go away any faster.  You immune system must fight this illness.  You will likely have symptoms for 7-10 days as this is how long a typical virus lasts.  Below are a few things that you can do at home to help yourself feel better in the mean time.     1.  For patients above 6 months of age who are not allergic to and are not on anticoagulants, you can alternate Tylenol and Motrin every 4-6 hours for fever above 100.4F and/or pain.  For patients less than 6 months of age, allergic to or intolerant to NSAIDS, have gastritis, gastric ulcers, or history of GI bleeds, are pregnant, or are on anticoagulant therapy, you can take Tylenol every 4 hours as needed for fever above 100.4F and/or pain.     2.  Rest and keep yourself well hydrated.  Drink hot liquids (coffee, water, tea, hot chocolate, or soup) 10-12 times a day for 5-7 days.  Put liquid in a mug and place in microwave for 2.5 - 3 minutes. Pour hot liquid into another mug not used to microwave the liquid (to avoid burning your mouth) then sniff the steam from the cup and sip the heated liquid.    3.  You can use these over the counter medications/remedies to help with your symptoms:     Runny Nose:  Use an antihistamine such as Claritin, Zyrtec or Allegra to help dry you out.     Congestion:  Use pseudoephedrine (behind the counter) for congestion- Pseudoephedrine 30 mg up to 240 mg /day. Warning:  It can raise your blood pressure and give you palpitations, avoid with hypertension, palpitation history or severe cardiac disease.  Coricidin HBP is okay to use if you have high blood pressure.     Use mucinex (guaifenisin) up to 2400mg/day to break up/loosen any mucous. MucinexDM has a cough suppressant that can be used for cough and at night to stop the tickle in the back of your throat. Do not take Mucinex-D if taking pseudoephedrine or other decongestant.    Use Nasal Saline to  mechanically move any post nasal drip from your eustachian tubes or from the back of your throat.    Use Afrin in each nare, for no longer than 3 days, as it is addictive. It can also dry out your mucous membranes and cause elevated blood pressure.    Use Flonase 1-2 sprays/nostril per day. It is a local acting steroid nasal spray.  If you develop a bloody nose, stop using the medication immediately. Lightly sniff into nares as the upper nasal mucosa is where this medication works, not the throat.     Sore throat:  Use warm, salt water gargles to ease your throat pain- 1/2 tsp salt to 1 cup warm water, gargle as desired.  Chloraseptic sprays and throat lozenges will also help to ease throat pain. Continue to push fluids, the drier your throat the more it will hurt.    Sometimes Nyquil at night is beneficial to help you get some rest; however, it is sedating and does contain an antihistamine and Tylenol.  Make sure not to double up on these medications. Nyquil may additionally raise you blood pressure.       These things will help you to feel better and will speed your recovery.  If your condition fails to improve in a timely manner, you should receive another evaluation by your Primary Care Provider/Pediatrician to discuss your concerns or return to urgent care for a recheck.  If your condition worsens at any time, you should report immediately to your nearest Emergency Department for further evaluation. **You must understand that you have received Urgent Care treatment only and that you may be released before all of your medical problems are known or treated. You, the patient, are responsible to arrange for follow-up care as instructed.         ·   ·   · Follow up with your primary care in 2-5 days if symptoms have not improved, or you may return here.  · If you were referred to a specialist, please follow up with that specialty.  · If you were prescribed antibiotics, please take them to completion.  · If you were  prescribed a narcotic or any medication with sedative effects, do not drive or operate heavy equipment or machinery while taking these medications.  · You must understand that you have received treatment at an Urgent Care facility only, and that you may be released before all of your medical problems are known or treated. Urgent Care facilities are not equipped to handle life threatening emergencies. It is recommended that you go to an Emergency Department for further evaluation of worsening or concerning symptoms, or possibly life threatening conditions as discussed.                                        If you  smoke, please stop smoking

## 2019-06-13 ENCOUNTER — PATIENT OUTREACH (OUTPATIENT)
Dept: ADMINISTRATIVE | Facility: HOSPITAL | Age: 64
End: 2019-06-13

## 2021-05-06 ENCOUNTER — PATIENT MESSAGE (OUTPATIENT)
Dept: RESEARCH | Facility: HOSPITAL | Age: 66
End: 2021-05-06

## 2022-02-10 ENCOUNTER — PATIENT MESSAGE (OUTPATIENT)
Dept: ADMINISTRATIVE | Facility: HOSPITAL | Age: 67
End: 2022-02-10
Payer: COMMERCIAL

## 2023-06-28 DIAGNOSIS — Z12.11 COLON CANCER SCREENING: ICD-10-CM

## 2023-07-10 ENCOUNTER — PATIENT MESSAGE (OUTPATIENT)
Dept: ADMINISTRATIVE | Facility: HOSPITAL | Age: 68
End: 2023-07-10
Payer: COMMERCIAL

## 2023-11-18 ENCOUNTER — PATIENT MESSAGE (OUTPATIENT)
Dept: ADMINISTRATIVE | Facility: HOSPITAL | Age: 68
End: 2023-11-18
Payer: COMMERCIAL

## 2023-11-19 DIAGNOSIS — Z12.11 SCREENING FOR COLON CANCER: ICD-10-CM

## 2023-12-18 LAB
LEFT EYE DM RETINOPATHY: NEGATIVE
RIGHT EYE DM RETINOPATHY: NEGATIVE

## 2023-12-21 DIAGNOSIS — Z12.31 OTHER SCREENING MAMMOGRAM: ICD-10-CM

## 2024-08-28 LAB
LEFT EYE DM RETINOPATHY: NEGATIVE
RIGHT EYE DM RETINOPATHY: NEGATIVE

## 2024-12-14 ENCOUNTER — PATIENT MESSAGE (OUTPATIENT)
Dept: ADMINISTRATIVE | Facility: HOSPITAL | Age: 69
End: 2024-12-14

## 2024-12-15 DIAGNOSIS — Z12.11 SCREENING FOR COLON CANCER: ICD-10-CM

## 2025-01-15 ENCOUNTER — PATIENT MESSAGE (OUTPATIENT)
Dept: ADMINISTRATIVE | Facility: HOSPITAL | Age: 70
End: 2025-01-15

## 2025-01-15 DIAGNOSIS — Z12.11 COLON CANCER SCREENING: Primary | ICD-10-CM

## 2025-01-15 NOTE — TELEPHONE ENCOUNTER
Can Cologuard be ordered in place of IFOBT? IFOBT was ordered for health maintenance on 12/15/2025, but IFOBT is not available in Clinic. Please advise

## 2025-02-03 ENCOUNTER — PATIENT OUTREACH (OUTPATIENT)
Dept: ADMINISTRATIVE | Facility: HOSPITAL | Age: 70
End: 2025-02-03

## 2025-02-03 NOTE — PROGRESS NOTES
Chart reviewed, immunization record updated.  No new results noted on [facility] or [facility] web site.  Care Everywhere updated.   Patient care coordination note  Next PCP visit 03/10/2025  LOV with PCP 12/09/2024   Contacted patient to confirm if she received the Cologuard kit that was ordered for her on 01/15/2025. Patient stated she did receive the stool sample kit and will complete it soon. Patient is on the Colorectal Cancer Screening Gap Report.    No

## 2025-03-18 ENCOUNTER — PATIENT OUTREACH (OUTPATIENT)
Dept: ADMINISTRATIVE | Facility: HOSPITAL | Age: 70
End: 2025-03-18

## 2025-04-14 PROBLEM — R29.6 FREQUENT FALLS: Status: ACTIVE | Noted: 2025-04-14

## 2025-04-22 PROBLEM — N39.41 URGE INCONTINENCE: Status: ACTIVE | Noted: 2025-04-22

## 2025-06-03 LAB
LEFT EYE DM RETINOPATHY: NEGATIVE
RIGHT EYE DM RETINOPATHY: NEGATIVE

## 2025-06-19 ENCOUNTER — PATIENT OUTREACH (OUTPATIENT)
Dept: ADMINISTRATIVE | Facility: HOSPITAL | Age: 70
End: 2025-06-19

## 2025-08-13 ENCOUNTER — PATIENT OUTREACH (OUTPATIENT)
Dept: ADMINISTRATIVE | Facility: HOSPITAL | Age: 70
End: 2025-08-13